# Patient Record
Sex: FEMALE | Race: NATIVE HAWAIIAN OR OTHER PACIFIC ISLANDER | HISPANIC OR LATINO
[De-identification: names, ages, dates, MRNs, and addresses within clinical notes are randomized per-mention and may not be internally consistent; named-entity substitution may affect disease eponyms.]

---

## 2017-03-31 ENCOUNTER — LAB SERVICES (OUTPATIENT)
Dept: OTHER | Age: 42
End: 2017-03-31

## 2017-03-31 ENCOUNTER — CHARTING TRANS (OUTPATIENT)
Dept: OBGYN | Age: 42
End: 2017-03-31

## 2017-04-05 LAB — AP REPORT: NORMAL

## 2017-04-10 LAB — HPV I/H RISK 4 DNA CVX QL NAA+PROBE: NORMAL

## 2017-04-13 ENCOUNTER — IMAGING SERVICES (OUTPATIENT)
Dept: OTHER | Age: 42
End: 2017-04-13

## 2017-06-24 ENCOUNTER — CHARTING TRANS (OUTPATIENT)
Dept: OTHER | Age: 42
End: 2017-06-24

## 2017-06-26 ENCOUNTER — CHARTING TRANS (OUTPATIENT)
Dept: OTHER | Age: 42
End: 2017-06-26

## 2018-04-09 ENCOUNTER — CHARTING TRANS (OUTPATIENT)
Dept: OTHER | Age: 43
End: 2018-04-09

## 2018-04-10 ENCOUNTER — CHARTING TRANS (OUTPATIENT)
Dept: OTHER | Age: 43
End: 2018-04-10

## 2018-04-13 ENCOUNTER — CHARTING TRANS (OUTPATIENT)
Dept: OTHER | Age: 43
End: 2018-04-13

## 2018-04-24 ENCOUNTER — IMAGING SERVICES (OUTPATIENT)
Dept: OTHER | Age: 43
End: 2018-04-24

## 2018-04-25 ENCOUNTER — CHARTING TRANS (OUTPATIENT)
Dept: OTHER | Age: 43
End: 2018-04-25

## 2018-05-07 ENCOUNTER — IMAGING SERVICES (OUTPATIENT)
Dept: OTHER | Age: 43
End: 2018-05-07

## 2018-05-07 ENCOUNTER — CHARTING TRANS (OUTPATIENT)
Dept: OTHER | Age: 43
End: 2018-05-07

## 2018-05-08 ENCOUNTER — CHARTING TRANS (OUTPATIENT)
Dept: OTHER | Age: 43
End: 2018-05-08

## 2018-05-09 ENCOUNTER — CHARTING TRANS (OUTPATIENT)
Dept: OTHER | Age: 43
End: 2018-05-09

## 2018-05-10 ENCOUNTER — ANCILLARY ORDERS (OUTPATIENT)
Dept: OTHER | Age: 43
End: 2018-05-10

## 2018-05-10 DIAGNOSIS — N20.0 CALCULUS OF KIDNEY: ICD-10-CM

## 2018-05-17 ENCOUNTER — CHARTING TRANS (OUTPATIENT)
Dept: OTHER | Age: 43
End: 2018-05-17

## 2018-05-18 ENCOUNTER — APPOINTMENT (OUTPATIENT)
Dept: CT IMAGING | Facility: HOSPITAL | Age: 43
End: 2018-05-18
Payer: COMMERCIAL

## 2018-05-18 ENCOUNTER — HOSPITAL ENCOUNTER (EMERGENCY)
Facility: HOSPITAL | Age: 43
Discharge: HOME | End: 2018-05-18
Payer: COMMERCIAL

## 2018-05-18 ENCOUNTER — CHARTING TRANS (OUTPATIENT)
Dept: OTHER | Age: 43
End: 2018-05-18

## 2018-05-18 VITALS
HEIGHT: 58 IN | SYSTOLIC BLOOD PRESSURE: 108 MMHG | HEART RATE: 61 BPM | BODY MASS INDEX: 31.49 KG/M2 | DIASTOLIC BLOOD PRESSURE: 64 MMHG | TEMPERATURE: 98 F | RESPIRATION RATE: 16 BRPM | WEIGHT: 150 LBS | OXYGEN SATURATION: 95 %

## 2018-05-18 DIAGNOSIS — N20.0 KIDNEY STONE: Primary | ICD-10-CM

## 2018-05-18 DIAGNOSIS — N23 URETER COLIC: ICD-10-CM

## 2018-05-18 PROCEDURE — 99284 EMERGENCY DEPT VISIT MOD MDM: CPT

## 2018-05-18 PROCEDURE — 96361 HYDRATE IV INFUSION ADD-ON: CPT

## 2018-05-18 PROCEDURE — 85025 COMPLETE CBC W/AUTO DIFF WBC: CPT

## 2018-05-18 PROCEDURE — 81001 URINALYSIS AUTO W/SCOPE: CPT

## 2018-05-18 PROCEDURE — 87086 URINE CULTURE/COLONY COUNT: CPT

## 2018-05-18 PROCEDURE — 96374 THER/PROPH/DIAG INJ IV PUSH: CPT

## 2018-05-18 PROCEDURE — 80053 COMPREHEN METABOLIC PANEL: CPT

## 2018-05-18 PROCEDURE — 83690 ASSAY OF LIPASE: CPT

## 2018-05-18 PROCEDURE — 96375 TX/PRO/DX INJ NEW DRUG ADDON: CPT

## 2018-05-18 PROCEDURE — 74176 CT ABD & PELVIS W/O CONTRAST: CPT

## 2018-05-18 RX ORDER — SODIUM CHLORIDE 9 MG/ML
INJECTION, SOLUTION INTRAVENOUS CONTINUOUS
Status: DISCONTINUED | OUTPATIENT
Start: 2018-05-18 | End: 2018-05-18

## 2018-05-18 RX ORDER — ONDANSETRON 2 MG/ML
4 INJECTION INTRAMUSCULAR; INTRAVENOUS ONCE
Status: COMPLETED | OUTPATIENT
Start: 2018-05-18 | End: 2018-05-18

## 2018-05-18 RX ORDER — ONDANSETRON 4 MG/1
4 TABLET, ORALLY DISINTEGRATING ORAL EVERY 6 HOURS PRN
Qty: 10 TABLET | Refills: 0 | Status: SHIPPED | OUTPATIENT
Start: 2018-05-18 | End: 2018-05-25

## 2018-05-18 RX ORDER — HYDROCODONE BITARTRATE AND ACETAMINOPHEN 5; 325 MG/1; MG/1
2 TABLET ORAL ONCE
Status: COMPLETED | OUTPATIENT
Start: 2018-05-18 | End: 2018-05-18

## 2018-05-18 RX ORDER — HYDROCODONE BITARTRATE AND ACETAMINOPHEN 5; 325 MG/1; MG/1
1-2 TABLET ORAL EVERY 4 HOURS PRN
Qty: 10 TABLET | Refills: 0 | Status: SHIPPED | OUTPATIENT
Start: 2018-05-18 | End: 2018-05-28

## 2018-05-18 RX ORDER — HYDROMORPHONE HYDROCHLORIDE 1 MG/ML
1 INJECTION, SOLUTION INTRAMUSCULAR; INTRAVENOUS; SUBCUTANEOUS ONCE
Status: COMPLETED | OUTPATIENT
Start: 2018-05-18 | End: 2018-05-18

## 2018-05-18 RX ORDER — KETOROLAC TROMETHAMINE 30 MG/ML
10 INJECTION, SOLUTION INTRAMUSCULAR; INTRAVENOUS ONCE
Status: COMPLETED | OUTPATIENT
Start: 2018-05-18 | End: 2018-05-18

## 2018-05-18 NOTE — ED PROVIDER NOTES
Patient Seen in: [facility] Emergency Department    History   Patient presents with:  Abdomen/Flank Pain (GI/)    Stated Complaint: abd pain     HPI    Pleasant female presents with history of kidney stones in the past, currently states that just Normocephalic and atraumatic. Mucus membranes pink and moist,   Neck: Supple with normal range of motion.    Chest: clear breath sounds      Heart: Regular rate and rhythm     Abdomen: Soft, nondistended,  No tenderness     Back: Mild right costovertebra of infection. No other stones in the urinary system. Normal appendix. Remainder of the abdomen and pelvis are unremarkable. Lack of intravenous contrast diminishes evaluation of the visceral organs.       Case discussed with  Dr. Ashwin Caballero at 3:40 AM ce 0

## 2018-05-18 NOTE — ED INITIAL ASSESSMENT (HPI)
Pt presents to ED with complaint of R flank pain. Pt reports she has been seeing urology for the last month or so due to blood in her urine. Reports she does have kidney stones on the right and is scheduled for lithotripsy in June.  Reports sudden onset sev

## 2018-05-19 ENCOUNTER — LAB SERVICES (OUTPATIENT)
Dept: OTHER | Age: 43
End: 2018-05-19

## 2018-05-19 LAB
ALBUMIN SERPL-MCNC: 3.7 G/DL (ref 3.6–5.1)
ALP SERPL-CCNC: 111 U/L (ref 45–105)
ALT SERPL-CCNC: 36 U/L (ref 15–43)
AST SERPL-CCNC: 26 U/L (ref 14–43)
BILIRUB SERPL-MCNC: 0.3 MG/DL (ref 0–1.3)
BILIRUBIN URINE: NEGATIVE
BLOOD URINE: ABNORMAL
BUN SERPL-MCNC: 12 MG/DL (ref 7–20)
CALCIUM SERPL-MCNC: 9.3 MG/DL (ref 8.6–10.6)
CHLORIDE SERPL-SCNC: 105 MMOL/L (ref 96–107)
CHOLEST SERPL-MCNC: 153 MG/DL (ref 140–200)
CLARITY: ABNORMAL
CO2 SERPL-SCNC: 27 MMOL/L (ref 22–32)
COLOR: ABNORMAL
CREAT SERPL-MCNC: 0.5 MG/DL (ref 0.5–1.4)
DIFFERENTIAL TYPE: ABNORMAL
GFR SERPL CREATININE-BSD FRML MDRD: >60 ML/MIN/{1.73M2}
GFR SERPL CREATININE-BSD FRML MDRD: >60 ML/MIN/{1.73M2}
GLUCOSE P FAST SERPL-MCNC: 88 MG/DL (ref 60–100)
GLUCOSE QUALITATIVE U: NEGATIVE
HDLC SERPL-MCNC: 59 MG/DL
HEMATOCRIT: 34.8 % (ref 34–45)
HEMOGLOBIN: 12.5 G/DL (ref 11.2–15.7)
KETONES, URINE: NEGATIVE
LDLC SERPL CALC-MCNC: 80 MG/DL (ref 30–100)
LEUKOCYTE ESTERASE URINE: ABNORMAL
LYMPH PERCENT: 54.3 % (ref 20.5–51.1)
LYMPHOCYTE ABSOLUTE #: 1.6 10*3/UL (ref 1.2–3.4)
MEAN CORPUSCULAR HGB CONCENTRATION: 35.9 % (ref 32–36)
MEAN CORPUSCULAR HGB: 32.2 PG (ref 27–34)
MEAN CORPUSCULAR VOLUME: 89.7 FL (ref 79–95)
MEAN PLATELET VOLUME: 7.9 FL (ref 8.6–12.4)
MIXED %: 9.2 % (ref 4.3–12.9)
MIXED ABSOLUTE #: 0.3 10*3/UL (ref 0.2–0.9)
NEUTROPHIL ABSOLUTE #: 1.1 10*3/UL (ref 1.4–6.5)
NEUTROPHIL PERCENT: 36.5 % (ref 34–73.5)
NITRITE URINE: NEGATIVE
PH URINE: 5.5 (ref 5–7)
PLATELET COUNT: 206 10*3/UL (ref 150–400)
POTASSIUM SERPL-SCNC: 4.1 MMOL/L (ref 3.5–5.3)
PROT SERPL-MCNC: 6.3 G/DL (ref 6.4–8.5)
RED BLOOD CELL COUNT: 3.88 10*6/UL (ref 3.7–5.2)
RED CELL DISTRIBUTION WIDTH: 12.3 % (ref 11.3–14.8)
SODIUM SERPL-SCNC: 142 MMOL/L (ref 136–146)
SPECIFIC GRAVITY URINE: 1.02 (ref 1–1.03)
TRIGL SERPL-MCNC: 70 MG/DL (ref 0–200)
TSH SERPL DL<=0.05 MIU/L-ACNC: 3.03 M[IU]/L (ref 0.3–4.82)
URINE PROTEIN, QUAL (DIPSTICK): 30
UROBILINOGEN URINE: 0.2
WHITE BLOOD CELL COUNT: 3 10*3/UL (ref 4–10)

## 2018-05-20 ENCOUNTER — CHARTING TRANS (OUTPATIENT)
Dept: OTHER | Age: 43
End: 2018-05-20

## 2018-05-21 ENCOUNTER — CHARTING TRANS (OUTPATIENT)
Dept: OTHER | Age: 43
End: 2018-05-21

## 2018-05-22 ENCOUNTER — CHARTING TRANS (OUTPATIENT)
Dept: OTHER | Age: 43
End: 2018-05-22

## 2018-05-25 ENCOUNTER — CHARTING TRANS (OUTPATIENT)
Dept: OTHER | Age: 43
End: 2018-05-25

## 2018-05-27 ENCOUNTER — APPOINTMENT (OUTPATIENT)
Dept: GENERAL RADIOLOGY | Facility: HOSPITAL | Age: 43
End: 2018-05-27
Attending: EMERGENCY MEDICINE
Payer: COMMERCIAL

## 2018-05-27 ENCOUNTER — HOSPITAL ENCOUNTER (EMERGENCY)
Facility: HOSPITAL | Age: 43
Discharge: HOME | End: 2018-05-27
Attending: EMERGENCY MEDICINE
Payer: COMMERCIAL

## 2018-05-27 VITALS
OXYGEN SATURATION: 97 % | WEIGHT: 180 LBS | HEART RATE: 62 BPM | BODY MASS INDEX: 33.13 KG/M2 | SYSTOLIC BLOOD PRESSURE: 123 MMHG | RESPIRATION RATE: 18 BRPM | DIASTOLIC BLOOD PRESSURE: 74 MMHG | HEIGHT: 62 IN | TEMPERATURE: 99 F

## 2018-05-27 DIAGNOSIS — T83.122A DISPLACEMENT OF URETERAL STENT, INITIAL ENCOUNTER (HCC): Primary | ICD-10-CM

## 2018-05-27 PROCEDURE — 99283 EMERGENCY DEPT VISIT LOW MDM: CPT

## 2018-05-27 PROCEDURE — 99284 EMERGENCY DEPT VISIT MOD MDM: CPT

## 2018-05-27 PROCEDURE — 74018 RADEX ABDOMEN 1 VIEW: CPT | Performed by: EMERGENCY MEDICINE

## 2018-05-27 RX ORDER — LIDOCAINE HYDROCHLORIDE 20 MG/ML
20 JELLY TOPICAL ONCE
Status: COMPLETED | OUTPATIENT
Start: 2018-05-27 | End: 2018-05-27

## 2018-05-27 NOTE — ED INITIAL ASSESSMENT (HPI)
Pt here with c.o urinary frequency and states she has had some incontinence. Pt recently had kidney stone and currently has a stent placed, pt states while she was using the bathroom and cleaning herself she pulled on the string of the stent.

## 2018-05-28 ENCOUNTER — CHARTING TRANS (OUTPATIENT)
Dept: OTHER | Age: 43
End: 2018-05-28

## 2018-05-28 NOTE — ED PROVIDER NOTES
Patient Seen in: [facility] Emergency Department    History   Patient presents with:  Urinary Symptoms (urologic)    Stated Complaint: frequent urination.   hx of urethral stent placed last week    HPI    44-year-old female presents to the emergency rhythm, normal heart sounds and intact distal pulses. Pulmonary/Chest: Effort normal and breath sounds normal. No stridor. She has no wheezes. Abdominal: Soft. Bowel sounds are normal. There is no tenderness. There is no rebound.    Genitourinary:   Karina Hollingsworth

## 2018-06-04 ENCOUNTER — CHARTING TRANS (OUTPATIENT)
Dept: OTHER | Age: 43
End: 2018-06-04

## 2018-06-04 ENCOUNTER — LAB SERVICES (OUTPATIENT)
Dept: OTHER | Age: 43
End: 2018-06-04

## 2018-06-04 LAB
ALBUMIN SERPL-MCNC: 4.6 G/DL (ref 3.6–5.1)
ALP SERPL-CCNC: 114 U/L (ref 45–105)
ALT SERPL-CCNC: 34 U/L (ref 15–43)
AST SERPL-CCNC: 28 U/L (ref 14–43)
BILIRUB CONJ SERPL-MCNC: 0 MG/DL (ref 0–0.3)
BILIRUB SERPL-MCNC: 0.3 MG/DL (ref 0–1.3)
PROT SERPL-MCNC: 7.3 G/DL (ref 6.4–8.5)

## 2018-06-08 ENCOUNTER — CHARTING TRANS (OUTPATIENT)
Dept: OTHER | Age: 43
End: 2018-06-08

## 2018-06-14 ENCOUNTER — ANCILLARY ORDERS (OUTPATIENT)
Dept: OTHER | Age: 43
End: 2018-06-14

## 2018-06-14 DIAGNOSIS — R74.8 ABNORMAL LEVELS OF OTHER SERUM ENZYMES: ICD-10-CM

## 2018-11-23 ENCOUNTER — IMAGING SERVICES (OUTPATIENT)
Dept: OTHER | Age: 43
End: 2018-11-23

## 2018-11-28 VITALS
BODY MASS INDEX: 37.76 KG/M2 | WEIGHT: 175 LBS | SYSTOLIC BLOOD PRESSURE: 120 MMHG | DIASTOLIC BLOOD PRESSURE: 72 MMHG | HEIGHT: 57 IN

## 2019-03-05 VITALS
RESPIRATION RATE: 16 BRPM | HEIGHT: 58 IN | SYSTOLIC BLOOD PRESSURE: 110 MMHG | DIASTOLIC BLOOD PRESSURE: 62 MMHG | BODY MASS INDEX: 37.57 KG/M2 | HEART RATE: 62 BPM | WEIGHT: 179 LBS | TEMPERATURE: 97.7 F

## 2019-03-06 VITALS
SYSTOLIC BLOOD PRESSURE: 110 MMHG | BODY MASS INDEX: 38.62 KG/M2 | DIASTOLIC BLOOD PRESSURE: 64 MMHG | WEIGHT: 179 LBS | TEMPERATURE: 97.6 F | HEART RATE: 64 BPM | RESPIRATION RATE: 20 BRPM | HEIGHT: 57 IN

## 2019-03-06 VITALS
WEIGHT: 180 LBS | DIASTOLIC BLOOD PRESSURE: 60 MMHG | BODY MASS INDEX: 37.79 KG/M2 | OXYGEN SATURATION: 99 % | SYSTOLIC BLOOD PRESSURE: 98 MMHG | HEIGHT: 58 IN | HEART RATE: 82 BPM

## 2019-03-06 VITALS
RESPIRATION RATE: 16 BRPM | HEART RATE: 76 BPM | DIASTOLIC BLOOD PRESSURE: 68 MMHG | HEIGHT: 58 IN | WEIGHT: 180 LBS | TEMPERATURE: 97.7 F | BODY MASS INDEX: 37.79 KG/M2 | SYSTOLIC BLOOD PRESSURE: 118 MMHG

## 2020-01-27 DIAGNOSIS — Z23 NEED FOR VACCINATION: Primary | ICD-10-CM

## 2020-01-27 PROCEDURE — 90471 IMMUNIZATION ADMIN: CPT

## 2020-01-27 PROCEDURE — 90686 IIV4 VACC NO PRSV 0.5 ML IM: CPT

## 2020-02-14 ENCOUNTER — LAB SERVICES (OUTPATIENT)
Dept: LAB | Age: 45
End: 2020-02-14

## 2020-02-14 ENCOUNTER — OFFICE VISIT (OUTPATIENT)
Dept: INTERNAL MEDICINE | Age: 45
End: 2020-02-14

## 2020-02-14 VITALS — DIASTOLIC BLOOD PRESSURE: 74 MMHG | SYSTOLIC BLOOD PRESSURE: 112 MMHG | TEMPERATURE: 97.6 F | HEART RATE: 64 BPM

## 2020-02-14 DIAGNOSIS — R21 RASH: Primary | ICD-10-CM

## 2020-02-14 DIAGNOSIS — Z00.00 GENERAL MEDICAL EXAM: ICD-10-CM

## 2020-02-14 DIAGNOSIS — R53.83 OTHER FATIGUE: ICD-10-CM

## 2020-02-14 LAB
ALBUMIN SERPL-MCNC: 4.4 G/DL (ref 3.6–5.1)
ALP SERPL-CCNC: 126 U/L (ref 45–130)
ALT SERPL W/O P-5'-P-CCNC: 16 U/L (ref 4–38)
AST SERPL-CCNC: 37 U/L (ref 14–43)
BILIRUB SERPL-MCNC: 0.9 MG/DL (ref 0–1.3)
BUN SERPL-MCNC: 14 MG/DL (ref 7–20)
CALCIUM SERPL-MCNC: 9.4 MG/DL (ref 8.6–10.6)
CHLORIDE SERPL-SCNC: 105 MMOL/L (ref 96–107)
CHOLEST SERPL-MCNC: 175 MG/DL (ref 140–200)
CO2 SERPL-SCNC: 29 MMOL/L (ref 22–32)
CREAT SERPL-MCNC: 0.6 MG/DL (ref 0.5–1.4)
DIFFERENTIAL TYPE: ABNORMAL
EOSINOPHIL % MD: 2 % (ref 0–10)
EOSINOPHIL ABSOLUTE # MD: 0.1 /UL (ref 0–0.5)
GFR SERPL CREATININE-BSD FRML MDRD: >60 ML/MIN/{1.73M2}
GFR SERPL CREATININE-BSD FRML MDRD: >60 ML/MIN/{1.73M2}
GLUCOSE P FAST SERPL-MCNC: 91 MG/DL (ref 60–100)
HDLC SERPL-MCNC: 69 MG/DL
HEMATOCRIT: 39.3 % (ref 34–45)
HEMOGLOBIN: 13.3 G/DL (ref 11.2–15.7)
LDLC SERPL CALC-MCNC: 95 MG/DL (ref 30–100)
LYMPH PERCENT MD: 60 % (ref 20.5–51.1)
LYMPHOCYTE ABSOLUTE # MD: 1.7 /UL (ref 1.2–3.4)
MEAN CORPUSCULAR HGB CONCENTRATION: 33.8 % (ref 32–36)
MEAN CORPUSCULAR HGB: 31.4 PG (ref 27–34)
MEAN CORPUSCULAR VOLUME: 92.7 FL (ref 79–95)
MEAN PLATELET VOLUME: 9.3 FL (ref 8.6–12.4)
MONOCYTE ABSOLUTE # MD: 0.1 /UL (ref 0.2–0.9)
MONOCYTE PERCENT MD: 4 % (ref 4.3–12.9)
NEUTROPHIL ABSOLUTE # MD: 1 /UL (ref 1.4–6.5)
NEUTROPHIL PERCENT MD: 34 % (ref 34–73.5)
PLATELET COUNT: 229 10*3/UL (ref 150–400)
POTASSIUM SERPL-SCNC: 5 MMOL/L (ref 3.5–5.3)
PROT SERPL-MCNC: 7.9 G/DL (ref 6.4–8.5)
RBC & PLT MORPHOLOGY: NORMAL
RED BLOOD CELL COUNT: 4.24 10*6/UL (ref 3.7–5.2)
RED CELL DISTRIBUTION WIDTH: 11.8 % (ref 11.3–14.8)
SODIUM SERPL-SCNC: 139 MMOL/L (ref 136–146)
TRIGL SERPL-MCNC: 56 MG/DL (ref 0–200)
TSH SERPL DL<=0.05 MIU/L-ACNC: 1.47 M[IU]/L (ref 0.3–4.82)
WHITE BLOOD CELL COUNT: 2.8 10*3/UL (ref 4–10)

## 2020-02-14 PROCEDURE — 99212 OFFICE O/P EST SF 10 MIN: CPT | Performed by: INTERNAL MEDICINE

## 2020-02-14 PROCEDURE — 80061 LIPID PANEL: CPT | Performed by: INTERNAL MEDICINE

## 2020-02-14 PROCEDURE — 80050 GENERAL HEALTH PANEL: CPT | Performed by: INTERNAL MEDICINE

## 2020-02-14 PROCEDURE — 36415 COLL VENOUS BLD VENIPUNCTURE: CPT | Performed by: INTERNAL MEDICINE

## 2020-02-14 RX ORDER — PHENTERMINE HYDROCHLORIDE 37.5 MG/1
37.5 TABLET ORAL EVERY MORNING
Refills: 0 | COMMUNITY
Start: 2020-01-27

## 2020-02-14 SDOH — HEALTH STABILITY: MENTAL HEALTH: HOW OFTEN DO YOU HAVE 6 OR MORE DRINKS ON ONE OCCASION?: NEVER

## 2020-02-14 SDOH — HEALTH STABILITY: MENTAL HEALTH: HOW MANY STANDARD DRINKS CONTAINING ALCOHOL DO YOU HAVE ON A TYPICAL DAY?: 1 OR 2

## 2020-02-14 SDOH — HEALTH STABILITY: MENTAL HEALTH: HOW OFTEN DO YOU HAVE A DRINK CONTAINING ALCOHOL?: MONTHLY OR LESS

## 2020-02-14 ASSESSMENT — PATIENT HEALTH QUESTIONNAIRE - PHQ9
SUM OF ALL RESPONSES TO PHQ9 QUESTIONS 1 AND 2: 0
2. FEELING DOWN, DEPRESSED OR HOPELESS: NOT AT ALL
1. LITTLE INTEREST OR PLEASURE IN DOING THINGS: NOT AT ALL
SUM OF ALL RESPONSES TO PHQ9 QUESTIONS 1 AND 2: 0

## 2020-02-25 ENCOUNTER — OFFICE VISIT (OUTPATIENT)
Dept: INTERNAL MEDICINE | Age: 45
End: 2020-02-25

## 2020-02-25 ENCOUNTER — LAB SERVICES (OUTPATIENT)
Dept: LAB | Age: 45
End: 2020-02-25

## 2020-02-25 VITALS
RESPIRATION RATE: 18 BRPM | HEIGHT: 61 IN | BODY MASS INDEX: 32.1 KG/M2 | TEMPERATURE: 98.2 F | SYSTOLIC BLOOD PRESSURE: 118 MMHG | WEIGHT: 170 LBS | DIASTOLIC BLOOD PRESSURE: 74 MMHG | HEART RATE: 76 BPM

## 2020-02-25 DIAGNOSIS — D72.819 LEUKOPENIA, UNSPECIFIED TYPE: ICD-10-CM

## 2020-02-25 DIAGNOSIS — Z12.31 VISIT FOR SCREENING MAMMOGRAM: ICD-10-CM

## 2020-02-25 DIAGNOSIS — Z00.00 GENERAL MEDICAL EXAM: Primary | ICD-10-CM

## 2020-02-25 LAB
BASOPHIL %: 0.5 % (ref 0–1.2)
BASOPHIL ABSOLUTE #: 0 10*3/UL (ref 0–0.1)
DIFFERENTIAL TYPE: NORMAL
EOSINOPHIL %: 0.5 % (ref 0–10)
EOSINOPHIL ABSOLUTE #: 0 10*3/UL (ref 0–0.5)
HEMATOCRIT: 38.4 % (ref 34–45)
HEMOGLOBIN: 13 G/DL (ref 11.2–15.7)
LYMPH PERCENT: 49.7 % (ref 20.5–51.1)
LYMPHOCYTE ABSOLUTE #: 2 10*3/UL (ref 1.2–3.4)
MEAN CORPUSCULAR HGB CONCENTRATION: 33.9 % (ref 32–36)
MEAN CORPUSCULAR HGB: 31.4 PG (ref 27–34)
MEAN CORPUSCULAR VOLUME: 92.8 FL (ref 79–95)
MEAN PLATELET VOLUME: 9.8 FL (ref 8.6–12.4)
MONOCYTE ABSOLUTE #: 0.2 10*3/UL (ref 0.2–0.9)
MONOCYTE PERCENT: 4.3 % (ref 4.3–12.9)
NEUTROPHIL ABSOLUTE #: 1.8 10*3/UL (ref 1.4–6.5)
NEUTROPHIL PERCENT: 45 % (ref 34–73.5)
PLATELET COUNT: 238 10*3/UL (ref 150–400)
RED BLOOD CELL COUNT: 4.14 10*6/UL (ref 3.7–5.2)
RED CELL DISTRIBUTION WIDTH: 11.9 % (ref 11.3–14.8)
WHITE BLOOD CELL COUNT: 4 10*3/UL (ref 4–10)

## 2020-02-25 PROCEDURE — 99396 PREV VISIT EST AGE 40-64: CPT | Performed by: INTERNAL MEDICINE

## 2020-02-25 PROCEDURE — 36415 COLL VENOUS BLD VENIPUNCTURE: CPT | Performed by: INTERNAL MEDICINE

## 2020-02-25 PROCEDURE — 85025 COMPLETE CBC W/AUTO DIFF WBC: CPT | Performed by: INTERNAL MEDICINE

## 2020-02-25 SDOH — HEALTH STABILITY: MENTAL HEALTH: HOW MANY STANDARD DRINKS CONTAINING ALCOHOL DO YOU HAVE ON A TYPICAL DAY?: 1 OR 2

## 2020-02-25 SDOH — HEALTH STABILITY: MENTAL HEALTH: HOW OFTEN DO YOU HAVE 6 OR MORE DRINKS ON ONE OCCASION?: NEVER

## 2020-02-25 SDOH — HEALTH STABILITY: MENTAL HEALTH: HOW OFTEN DO YOU HAVE A DRINK CONTAINING ALCOHOL?: MONTHLY OR LESS

## 2020-02-25 ASSESSMENT — PATIENT HEALTH QUESTIONNAIRE - PHQ9
1. LITTLE INTEREST OR PLEASURE IN DOING THINGS: NOT AT ALL
SUM OF ALL RESPONSES TO PHQ9 QUESTIONS 1 AND 2: 0
2. FEELING DOWN, DEPRESSED OR HOPELESS: NOT AT ALL
SUM OF ALL RESPONSES TO PHQ9 QUESTIONS 1 AND 2: 0

## 2020-02-27 ENCOUNTER — TELEPHONE (OUTPATIENT)
Dept: INTERNAL MEDICINE | Age: 45
End: 2020-02-27

## 2025-01-11 ENCOUNTER — APPOINTMENT (OUTPATIENT)
Dept: GENERAL RADIOLOGY | Age: 50
End: 2025-01-11
Attending: EMERGENCY MEDICINE

## 2025-01-11 ENCOUNTER — HOSPITAL ENCOUNTER (EMERGENCY)
Age: 50
Discharge: HOME | End: 2025-01-12
Attending: EMERGENCY MEDICINE

## 2025-01-11 DIAGNOSIS — R07.89 CHEST WALL PAIN: Primary | ICD-10-CM

## 2025-01-11 LAB
ALBUMIN SERPL-MCNC: 4.5 G/DL (ref 3.2–4.8)
ALBUMIN/GLOB SERPL: 1.6 {RATIO} (ref 1–2)
ALP LIVER SERPL-CCNC: 136 U/L
ALT SERPL-CCNC: 14 U/L
ANION GAP SERPL CALC-SCNC: 6 MMOL/L (ref 0–18)
AST SERPL-CCNC: 22 U/L (ref ?–34)
BASOPHILS # BLD AUTO: 0.01 X10(3) UL (ref 0–0.2)
BASOPHILS NFR BLD AUTO: 0.2 %
BILIRUB SERPL-MCNC: 0.3 MG/DL (ref 0.3–1.2)
BUN BLD-MCNC: 14 MG/DL (ref 9–23)
CALCIUM BLD-MCNC: 9.8 MG/DL (ref 8.7–10.4)
CHLORIDE SERPL-SCNC: 107 MMOL/L (ref 98–112)
CO2 SERPL-SCNC: 28 MMOL/L (ref 21–32)
CREAT BLD-MCNC: 0.62 MG/DL
D DIMER PPP FEU-MCNC: <0.27 UG/ML FEU (ref ?–0.5)
EGFRCR SERPLBLD CKD-EPI 2021: 109 ML/MIN/1.73M2 (ref 60–?)
EOSINOPHIL # BLD AUTO: 0.05 X10(3) UL (ref 0–0.7)
EOSINOPHIL NFR BLD AUTO: 1.1 %
ERYTHROCYTE [DISTWIDTH] IN BLOOD BY AUTOMATED COUNT: 12.7 %
GLOBULIN PLAS-MCNC: 2.9 G/DL (ref 2–3.5)
GLUCOSE BLD-MCNC: 105 MG/DL (ref 70–99)
HCT VFR BLD AUTO: 35.6 %
HGB BLD-MCNC: 12.7 G/DL
IMM GRANULOCYTES # BLD AUTO: 0 X10(3) UL (ref 0–1)
IMM GRANULOCYTES NFR BLD: 0 %
LYMPHOCYTES # BLD AUTO: 2.21 X10(3) UL (ref 1–4)
LYMPHOCYTES NFR BLD AUTO: 49.7 %
MCH RBC QN AUTO: 32.5 PG (ref 26–34)
MCHC RBC AUTO-ENTMCNC: 35.7 G/DL (ref 31–37)
MCV RBC AUTO: 91 FL
MONOCYTES # BLD AUTO: 0.37 X10(3) UL (ref 0.1–1)
MONOCYTES NFR BLD AUTO: 8.3 %
NEUTROPHILS # BLD AUTO: 1.81 X10 (3) UL (ref 1.5–7.7)
NEUTROPHILS # BLD AUTO: 1.81 X10(3) UL (ref 1.5–7.7)
NEUTROPHILS NFR BLD AUTO: 40.7 %
OSMOLALITY SERPL CALC.SUM OF ELEC: 293 MOSM/KG (ref 275–295)
PLATELET # BLD AUTO: 252 10(3)UL (ref 150–450)
POTASSIUM SERPL-SCNC: 3.6 MMOL/L (ref 3.5–5.1)
PROT SERPL-MCNC: 7.4 G/DL (ref 5.7–8.2)
RBC # BLD AUTO: 3.91 X10(6)UL
SODIUM SERPL-SCNC: 141 MMOL/L (ref 136–145)
TROPONIN I SERPL HS-MCNC: <3 NG/L
WBC # BLD AUTO: 4.5 X10(3) UL (ref 4–11)

## 2025-01-11 PROCEDURE — 85025 COMPLETE CBC W/AUTO DIFF WBC: CPT | Performed by: EMERGENCY MEDICINE

## 2025-01-11 PROCEDURE — 85379 FIBRIN DEGRADATION QUANT: CPT | Performed by: EMERGENCY MEDICINE

## 2025-01-11 PROCEDURE — 71045 X-RAY EXAM CHEST 1 VIEW: CPT | Performed by: EMERGENCY MEDICINE

## 2025-01-11 PROCEDURE — 84484 ASSAY OF TROPONIN QUANT: CPT | Performed by: EMERGENCY MEDICINE

## 2025-01-11 PROCEDURE — 99285 EMERGENCY DEPT VISIT HI MDM: CPT

## 2025-01-11 PROCEDURE — 93005 ELECTROCARDIOGRAM TRACING: CPT

## 2025-01-11 PROCEDURE — 80053 COMPREHEN METABOLIC PANEL: CPT | Performed by: EMERGENCY MEDICINE

## 2025-01-11 PROCEDURE — 99284 EMERGENCY DEPT VISIT MOD MDM: CPT

## 2025-01-11 PROCEDURE — 93010 ELECTROCARDIOGRAM REPORT: CPT

## 2025-01-11 PROCEDURE — 96374 THER/PROPH/DIAG INJ IV PUSH: CPT

## 2025-01-11 RX ORDER — KETOROLAC TROMETHAMINE 15 MG/ML
15 INJECTION, SOLUTION INTRAMUSCULAR; INTRAVENOUS ONCE
Status: COMPLETED | OUTPATIENT
Start: 2025-01-11 | End: 2025-01-11

## 2025-01-11 RX ORDER — IBUPROFEN 600 MG/1
600 TABLET, FILM COATED ORAL EVERY 8 HOURS PRN
Qty: 30 TABLET | Refills: 0 | Status: SHIPPED | OUTPATIENT
Start: 2025-01-11

## 2025-01-11 RX ORDER — CYCLOBENZAPRINE HCL 10 MG
10 TABLET ORAL 3 TIMES DAILY PRN
Qty: 20 TABLET | Refills: 0 | Status: SHIPPED | OUTPATIENT
Start: 2025-01-11 | End: 2025-01-18

## 2025-01-12 VITALS
TEMPERATURE: 98 F | WEIGHT: 195 LBS | BODY MASS INDEX: 39.31 KG/M2 | OXYGEN SATURATION: 98 % | DIASTOLIC BLOOD PRESSURE: 79 MMHG | HEIGHT: 59 IN | SYSTOLIC BLOOD PRESSURE: 155 MMHG | RESPIRATION RATE: 18 BRPM | HEART RATE: 75 BPM

## 2025-01-12 LAB
ATRIAL RATE: 70 BPM
P AXIS: 63 DEGREES
P-R INTERVAL: 132 MS
Q-T INTERVAL: 416 MS
QRS DURATION: 88 MS
QTC CALCULATION (BEZET): 449 MS
R AXIS: 23 DEGREES
T AXIS: 48 DEGREES
VENTRICULAR RATE: 70 BPM

## 2025-01-12 NOTE — ED PROVIDER NOTES
Patient Seen in: [facility] [place]      History     Chief Complaint   Patient presents with    Chest Pain     Stated Complaint: Pt to ER with \"sharp\" left-sided chest pain that's radiating to her jaw. Family*    Subjective:   Patient is a 49-year-old female presents emergency room for evaluation of chest pain.  Patient does have been having chest pain left-sided chest wall since yesterday evening.  It woke her from sleep.  Patient and I are able to communicate without .  Patient reports no medications taken at home prior to arrival.  Patient reports no GERD-like symptoms.    The history is provided by the patient.             Objective:     History reviewed. No pertinent past medical history.           Past Surgical History:   Procedure Laterality Date    Tubal ligation                  Social History     Socioeconomic History    Marital status:    Tobacco Use    Smoking status: Never     Passive exposure: Never    Smokeless tobacco: Never   Vaping Use    Vaping status: Never Used   Substance and Sexual Activity    Alcohol use: Yes    Drug use: Never     Social Drivers of Health      Received from [facility]    Housing Stability                  Physical Exam     ED Triage Vitals [01/11/25 2223]   /79   Pulse 76   Resp 20   Temp 98 °F (36.7 °C)   Temp src Oral   SpO2 99 %   O2 Device None (Room air)       Current Vitals:   Vital Signs  BP: 155/79  Pulse: 76  Resp: 20  Temp: 98 °F (36.7 °C)  Temp src: Oral    Oxygen Therapy  SpO2: 99 %  O2 Device: None (Room air)        Physical Exam  Vitals and nursing note reviewed.   Constitutional:       General: She is not in acute distress.     Appearance: She is well-developed and normal weight. She is not toxic-appearing.   HENT:      Head: Normocephalic and atraumatic.   Cardiovascular:      Rate and Rhythm: Normal rate and regular rhythm.      Heart sounds: Normal heart sounds.   Pulmonary:      Effort:  Pulmonary effort is normal. No tachypnea or respiratory distress.      Breath sounds: Normal breath sounds. No wheezing.   Chest:      Chest wall: Tenderness present. No mass.      Comments: Tenderness of patient reproducible anterior left chest  Abdominal:      General: Bowel sounds are normal.      Palpations: Abdomen is soft.      Tenderness: There is no abdominal tenderness. There is no guarding or rebound.   Musculoskeletal:         General: Normal range of motion.      Cervical back: Normal range of motion and neck supple.      Right lower leg: No edema.   Skin:     General: Skin is warm.      Capillary Refill: Capillary refill takes less than 2 seconds.      Findings: No rash.   Neurological:      General: No focal deficit present.      Mental Status: She is alert and oriented to person, place, and time.   Psychiatric:         Mood and Affect: Mood normal.         Behavior: Behavior normal.             ED Course     Labs Reviewed   COMP METABOLIC PANEL (14) - Abnormal; Notable for the following components:       Result Value    Glucose 105 (*)     Alkaline Phosphatase 136 (*)     All other components within normal limits   TROPONIN I HIGH SENSITIVITY - Normal   D-DIMER - Normal   CBC WITH DIFFERENTIAL WITH PLATELET     EKG    Rate, intervals and axes as noted on EKG Report.  Rate: 70  Rhythm: Sinus Rhythm  Reading: Normal sinus rhythm no ST elevation NH interval of 132 QRS of 88 QTc of 449 with axes of 62/23/48                XR CHEST AP PORTABLE  (CPT=71045)    Result Date: 1/11/2025  PROCEDURE:  XR CHEST AP PORTABLE  (CPT=71045)  TECHNIQUE:  AP chest radiograph was obtained.  COMPARISON:  None.  INDICATIONS:  Pt to ER with sharp left-sided chest pain that's radiating to her jaw. Family says the pain started last night.  PATIENT STATED HISTORY: (As transcribed by Technologist)  Patient states anterior left sided chest pain that started last night.    FINDINGS:  The heart is normal in size.  The lungs are  clear of acute-appearing disease process.  The costophrenic angles are sharp.  There is no active disease seen on the basis of portable chest radiography.            CONCLUSION:  No active disease seen.   LOCATION:  [facility]      Dictated by (CST): Zheng Reed MD on 1/11/2025 at 11:23 PM     Finalized by (CST): Zheng Reed MD on 1/11/2025 at 11:23 PM             MDM      Social -negative tobacco, occasional etoh, negative drugs, denies pregnancy  Family History-noncontributory  Past Medical History-no significant past medical history    Differential diagnosis before testing included chest wall pain, acute coronary syndrome, pulmonary embolism, pneumothorax, muscle strain,    Co-morbidities that add to the complexity of management include: Not applicable    Testing ordered during this visit included chest x-ray EKG cardiac enzyme x 1 as symptoms going on since last night, D-dimer    Radiographic images  I personally reviewed the radiographs and my individual interpretation shows no acute process  I also reviewed the official reports that showed XR CHEST AP PORTABLE  (CPT=71045)    Result Date: 1/11/2025  PROCEDURE:  XR CHEST AP PORTABLE  (CPT=71045)  TECHNIQUE:  AP chest radiograph was obtained.  COMPARISON:  None.  INDICATIONS:  Pt to ER with sharp left-sided chest pain that's radiating to her jaw. Family says the pain started last night.  PATIENT STATED HISTORY: (As transcribed by Technologist)  Patient states anterior left sided chest pain that started last night.    FINDINGS:  The heart is normal in size.  The lungs are clear of acute-appearing disease process.  The costophrenic angles are sharp.  There is no active disease seen on the basis of portable chest radiography.            CONCLUSION:  No active disease seen.   LOCATION:  [facility]      Dictated by (CST): Zheng Reed MD on 1/11/2025 at 11:23 PM     Finalized by (CST): Zheng Reed MD on 1/11/2025 at 11:23 PM          External chart review  showed review of Care Everywhere in epic system shows no related comorbidities to current presentation    History obtained by an independent source included from patient, family    Discussion of management with patient, family    Social determinants of health that affect care include not applicable      Medications Provided: Toradol ice    Course of Events during Emergency Room Visit include 49-year-old female presents emergency room for evaluation of chest pain.  Patient is pain is reproducible with palpation.  Per family she is moving her arms a lot at work as she does 12 to 14-hour days at work.  Patient is right-hand dominant suspect that this is more musculoskeletal than it is cardiac her EKG is unremarkable.  Will get 1 set of cardiac enzymes and get baseline labs and D-dimer if the D-dimer is elevated we will get a CT angio of the chest.  Patient is PERC negative will recommend patient follow-up with cardiology as outpatient as she has never had a stress test before    Patient's troponin negative.  Patient chest x-ray shows no acute process EKG was unremarkable we will plan for discharge home and follow-up with primary care physician and cardiology as outpatient will start on course of anti-inflammatories.  Patient's troponins are negative as is her D-dimer.  Plan for discharge home on recheck the patient at 11:53 PM she is feeling much better.  Plan for discharge home      Disposition:          Discharge  I have discussed with the patient the results of test, differential diagnosis, treatment plan, warning signs and symptoms which should prompt immediate return.  They expressed understanding of these instructions and agrees to the following plan provided.  They were given written discharge instructions and agrees to return for any concerns and voiced understanding and all questions were answered.           Medical Decision Making      Disposition and Plan     Clinical Impression:  1. Chest wall pain          Disposition:  Discharge  1/11/2025 11:53 pm    Follow-up:  Arnaldo Mendoza MD  [address] [place] [address]  [phone]    Schedule an appointment as soon as possible for a visit      Lucio Marie MD  [address] [place] [id number]  [phone]    Schedule an appointment as soon as possible for a visit            Medications Prescribed:  Current Discharge Medication List        START taking these medications    Details   ibuprofen 600 MG Oral Tab Take 1 tablet (600 mg total) by mouth every 8 (eight) hours as needed for Pain or Fever.  Qty: 30 tablet, Refills: 0      cyclobenzaprine 10 MG Oral Tab Take 1 tablet (10 mg total) by mouth 3 (three) times daily as needed for Muscle spasms.  Qty: 20 tablet, Refills: 0                 Supplementary Documentation:

## (undated) NOTE — ED AVS SNAPSHOT
Therese Hodge   MRN: [id number]    Department:  [facility] Emergency Department   Date of Visit:  5/27/2018           Disclosure     Insurance plans vary and the physician(s) referred by the ER may not be covered by your plan.  Please co tell this physician (or your personal doctor if your instructions are to return to your personal doctor) about any new or lasting problems. The primary care or specialist physician will see patients referred from the [facility] Emergency Department.  Lauren Houser

## (undated) NOTE — ED AVS SNAPSHOT
Yue Davis   MRN: [id number]    Department:  [facility] Emergency Department   Date of Visit:  5/18/2018           Disclosure     Insurance plans vary and the physician(s) referred by the ER may not be covered by your plan.  Please co tell this physician (or your personal doctor if your instructions are to return to your personal doctor) about any new or lasting problems. The primary care or specialist physician will see patients referred from the [facility] Emergency Department.  Paxton Sheffield